# Patient Record
Sex: MALE | Race: ASIAN | NOT HISPANIC OR LATINO | URBAN - METROPOLITAN AREA
[De-identification: names, ages, dates, MRNs, and addresses within clinical notes are randomized per-mention and may not be internally consistent; named-entity substitution may affect disease eponyms.]

---

## 2022-09-26 ENCOUNTER — EMERGENCY (EMERGENCY)
Facility: HOSPITAL | Age: 27
LOS: 1 days | Discharge: LEFT BEFORE DISCHARGE | End: 2022-09-26
Admitting: EMERGENCY MEDICINE

## 2022-09-26 VITALS
DIASTOLIC BLOOD PRESSURE: 79 MMHG | HEART RATE: 53 BPM | TEMPERATURE: 98 F | SYSTOLIC BLOOD PRESSURE: 133 MMHG | RESPIRATION RATE: 18 BRPM | OXYGEN SATURATION: 99 %

## 2022-09-26 PROCEDURE — L9991: CPT

## 2022-09-26 NOTE — ED PROVIDER NOTE - PROGRESS NOTE DETAILS
CLIFTON Brock: Unable to locate pt in the ER, called at number in chart, pt states he left, was her because his bandage had fallen off? states he will return with any concerning symptoms.

## 2022-09-26 NOTE — ED ADULT TRIAGE NOTE - RESPIRATORY RATE (BREATHS/MIN)
18 -pt reports she is taking ibuprofen 600 qhs daily for many weeks  - Pt aware of side effects (PUD, worsening Kidney function)

## 2022-10-06 ENCOUNTER — EMERGENCY (EMERGENCY)
Facility: HOSPITAL | Age: 27
LOS: 1 days | Discharge: HOME | End: 2022-10-06
Attending: STUDENT IN AN ORGANIZED HEALTH CARE EDUCATION/TRAINING PROGRAM | Admitting: STUDENT IN AN ORGANIZED HEALTH CARE EDUCATION/TRAINING PROGRAM

## 2022-10-06 VITALS
SYSTOLIC BLOOD PRESSURE: 128 MMHG | OXYGEN SATURATION: 99 % | DIASTOLIC BLOOD PRESSURE: 84 MMHG | HEART RATE: 88 BPM | TEMPERATURE: 98 F | RESPIRATION RATE: 18 BRPM

## 2022-10-06 PROCEDURE — 99283 EMERGENCY DEPT VISIT LOW MDM: CPT

## 2022-10-06 NOTE — ED PROVIDER NOTE - CARE PLAN
1 Principal Discharge DX:	Encounter for removal of sutures   Principal Discharge DX:	Visit for wound check

## 2022-10-06 NOTE — ED PROVIDER NOTE - CLINICAL SUMMARY MEDICAL DECISION MAKING FREE TEXT BOX
suture removal between 4th and 5th digit webspace 9/25  mild wound dehiscence with sutures in place    recommending bacitracin once daily, return for suture removal in 5 days.   return precautions discussed.

## 2022-10-06 NOTE — ED PROVIDER NOTE - PHYSICAL EXAMINATION
CONSTITUTIONAL: Well-appearing; well-nourished; in no apparent distress;  HEAD: Normocephalic, atraumatic;  EYES: conjunctiva and sclera WNL;  ENT: normal nose;   NECK/LYMPH: Supple;   CARD: Normal S1, S2; no murmurs, rubs, or gallops noted  RESP: Normal chest excursion with respiration; breath sounds clear and equal bilaterally; no wheezes, rhonchi, or rales noted  EXT/MS: moves all extremities; distal pulses are normal, no pedal edema  SKIN: Normal for age and race; warm; dry; good turgor; + sutures in placed noted to L hand webspace between 4tha nd 5th digit, articulates well, mild wound dehiscence with stress on stitches. no erythema crepitus or drainage.   NEURO: Awake, alert, oriented x 3, no gross deficits, CN II-XII grossly intact, no motor or sensory deficit noted  PSYCH: Normal mood; appropriate affect

## 2022-10-06 NOTE — ED PROVIDER NOTE - OBJECTIVE STATEMENT
26yoM no PMH presenting for suture removal. Pt states that he cut his L hand in the web space between 4th and 5th digit after catching a fast ball in cricket. had stitches placed at [facility] on 9/25. admits to using bacitracin 2-3x daily. tdap UTD. pt deneis numbness, tingling, purulent drainage, redness. R hand dominant. works as an [employer]er .

## 2022-10-06 NOTE — ED PROVIDER NOTE - PATIENT PORTAL LINK FT
You can access the FollowMyHealth Patient Portal offered by [facility] by registering at the following website: [email or web address]. By joining [employer]’s FollowMyHealth portal, you will also be able to view your health information using other applications (apps) compatible with our system.

## 2022-10-06 NOTE — ED PROVIDER NOTE - NSFOLLOWUPINSTRUCTIONS_ED_ALL_ED_FT
return for suture removal in 4-5 days.  apply bacitracin daily  return for fever, redness, purulent drainage, or swelling, numbness, tingling.

## 2022-10-06 NOTE — ED PROVIDER NOTE - ATTENDING APP SHARED VISIT CONTRIBUTION OF CARE
I have personally performed a face to face medical and diagnostic evaluation of the patient. I have discussed with and reviewed the DEIRDRE's note and agree with the History, ROS, Physical Exam and MDM unless otherwise indicated. A brief summary of my personal evaluation and impression can be found below.    Otilia CHOUDHURY: 26M presents with a cc of suture removal, cut L hand inter web space 4th/5th after catching ball while playing cricket sutures placed at OSH on 9/25 applying bacitracin No other injuries no other complaints. No fever, no discharge. No rash.     All other ROS negative, except as above and as per HPI and ROS section.    VITALS: Initial triage and subsequent vitals have been reviewed by me.  GEN APPEARANCE: WDWN, alert, non-toxic, NAD  HEAD: Atraumatic.  EYES: PERRLa, EOMI, vision grossly intact.   NECK: Supple  MSK/EXT: No spinal or extremity point tenderness. No CVA ttp. Pelvis stable. No peripheral edema.  NEURO: Alert, follows commands. Weight bearing normal. Speech normal. Sensation and motor normal x4 extremities.   SKIN: Warm, dry and intact. No rash. sutures in place to L 4/5th inter web space, wound w mild dehiscence after stress, moist, does not appear infected   PSYCH: Appropriate    Plan/MDM: Otilia CHOUDHURY: 26M presents with a cc of suture removal, cut L hand inter web space 4th/5th after catching ball while playing cricket sutures placed at OSH on 9/25 applying bacitracin No other injuries no other complaints. No fever, no discharge. No rash.  exam vss non toxic PE as above ddx c/f wound to L inter web space, appears moist, do not think have achieved adequate healing for suture removal at this time, instructed pt to return to ED on Tuesday for eval again. Strict return precautions were discussed. Pt expressed understanding.

## 2022-10-11 ENCOUNTER — EMERGENCY (EMERGENCY)
Facility: HOSPITAL | Age: 27
LOS: 1 days | Discharge: HOME | End: 2022-10-11
Attending: EMERGENCY MEDICINE | Admitting: EMERGENCY MEDICINE

## 2022-10-11 VITALS
TEMPERATURE: 98 F | HEART RATE: 60 BPM | SYSTOLIC BLOOD PRESSURE: 124 MMHG | DIASTOLIC BLOOD PRESSURE: 77 MMHG | OXYGEN SATURATION: 100 % | RESPIRATION RATE: 16 BRPM

## 2022-10-11 VITALS
TEMPERATURE: 97 F | RESPIRATION RATE: 17 BRPM | OXYGEN SATURATION: 100 % | SYSTOLIC BLOOD PRESSURE: 136 MMHG | DIASTOLIC BLOOD PRESSURE: 88 MMHG | HEART RATE: 62 BPM

## 2022-10-11 PROBLEM — Z78.9 OTHER SPECIFIED HEALTH STATUS: Chronic | Status: ACTIVE | Noted: 2022-10-06

## 2022-10-11 PROCEDURE — 99283 EMERGENCY DEPT VISIT LOW MDM: CPT | Mod: 25

## 2022-10-11 PROCEDURE — 12001 RPR S/N/AX/GEN/TRNK 2.5CM/<: CPT

## 2022-10-11 NOTE — ED PROVIDER NOTE - PATIENT PORTAL LINK FT
You can access the FollowMyHealth Patient Portal offered by [facility] by registering at the following website: [email or web address]. By joining [employer]’s FollowMyHealth portal, you will also be able to view your health information using other applications (apps) compatible with our system.

## 2022-10-11 NOTE — ED ADULT NURSE NOTE - OBJECTIVE STATEMENT
27 y/o male presenting to QUID exam room 1. Patient AAOx4, ambulatory at baseline. Patient coming to ER for suture removal of right pinky. Denies pain, headache, chest pain, dizziness. Breathing even and unlabored. No swelling or signs of infection noted to wound at this time. Awaiting md orders.

## 2022-10-11 NOTE — ED PROVIDER NOTE - OBJECTIVE STATEMENT
18 male no medical history presents with suture removal.  Patient states sutures were placed approximately 2 weeks ago.  Between the left webspace between fourth and fifth finger.No pain no discharge apply bacitracin.

## 2022-10-11 NOTE — ED PROCEDURE NOTE - CPROC ED TIME OUT STATEMENT1
“Patient's name, , procedure and correct site were confirmed during the [place] Timeout.”
“Patient's name, , procedure and correct site were confirmed during the [place] Timeout.”

## 2022-10-11 NOTE — ED ADULT NURSE REASSESSMENT NOTE - NS ED NURSE REASSESS COMMENT FT1
Sutures removed. Patient tolerated procedure well. Denies headache and dizziness, Breathing even and unlabored. No pallor or diaphoresis noted at this time. Education provided.

## 2023-05-26 ENCOUNTER — EMERGENCY (EMERGENCY)
Facility: HOSPITAL | Age: 28
LOS: 0 days | Discharge: HOME | End: 2023-05-26
Attending: STUDENT IN AN ORGANIZED HEALTH CARE EDUCATION/TRAINING PROGRAM
Payer: MEDICAID

## 2023-05-26 VITALS
WEIGHT: 145.06 LBS | RESPIRATION RATE: 18 BRPM | HEART RATE: 54 BPM | OXYGEN SATURATION: 100 % | SYSTOLIC BLOOD PRESSURE: 152 MMHG | TEMPERATURE: 98 F | DIASTOLIC BLOOD PRESSURE: 93 MMHG

## 2023-05-26 VITALS
DIASTOLIC BLOOD PRESSURE: 76 MMHG | TEMPERATURE: 98 F | OXYGEN SATURATION: 99 % | HEART RATE: 61 BPM | RESPIRATION RATE: 17 BRPM | SYSTOLIC BLOOD PRESSURE: 128 MMHG

## 2023-05-26 DIAGNOSIS — G51.0 BELL'S PALSY: ICD-10-CM

## 2023-05-26 DIAGNOSIS — Z86.19 PERSONAL HISTORY OF OTHER INFECTIOUS AND PARASITIC DISEASES: ICD-10-CM

## 2023-05-26 DIAGNOSIS — R20.0 ANESTHESIA OF SKIN: ICD-10-CM

## 2023-05-26 DIAGNOSIS — R29.810 FACIAL WEAKNESS: ICD-10-CM

## 2023-05-26 LAB
ALBUMIN SERPL ELPH-MCNC: 3.5 G/DL — SIGNIFICANT CHANGE UP (ref 3.3–5)
ALP SERPL-CCNC: 90 U/L — SIGNIFICANT CHANGE UP (ref 40–120)
ALT FLD-CCNC: 32 U/L — SIGNIFICANT CHANGE UP (ref 12–78)
ANION GAP SERPL CALC-SCNC: 5 MMOL/L — SIGNIFICANT CHANGE UP (ref 5–17)
APTT BLD: 34.2 SEC — SIGNIFICANT CHANGE UP (ref 27.5–35.5)
AST SERPL-CCNC: 26 U/L — SIGNIFICANT CHANGE UP (ref 15–37)
BASOPHILS # BLD AUTO: 0.05 K/UL — SIGNIFICANT CHANGE UP (ref 0–0.2)
BASOPHILS NFR BLD AUTO: 0.6 % — SIGNIFICANT CHANGE UP (ref 0–2)
BILIRUB SERPL-MCNC: 0.8 MG/DL — SIGNIFICANT CHANGE UP (ref 0.2–1.2)
BUN SERPL-MCNC: 15 MG/DL — SIGNIFICANT CHANGE UP (ref 7–23)
CALCIUM SERPL-MCNC: 8.8 MG/DL — SIGNIFICANT CHANGE UP (ref 8.5–10.1)
CHLORIDE SERPL-SCNC: 102 MMOL/L — SIGNIFICANT CHANGE UP (ref 96–108)
CO2 SERPL-SCNC: 31 MMOL/L — SIGNIFICANT CHANGE UP (ref 22–31)
CREAT SERPL-MCNC: 1.41 MG/DL — HIGH (ref 0.5–1.3)
EGFR: 69 ML/MIN/1.73M2 — SIGNIFICANT CHANGE UP
EOSINOPHIL # BLD AUTO: 0.21 K/UL — SIGNIFICANT CHANGE UP (ref 0–0.5)
EOSINOPHIL NFR BLD AUTO: 2.7 % — SIGNIFICANT CHANGE UP (ref 0–6)
GLUCOSE SERPL-MCNC: 129 MG/DL — HIGH (ref 70–99)
HCT VFR BLD CALC: 50.1 % — HIGH (ref 39–50)
HGB BLD-MCNC: 16.4 G/DL — SIGNIFICANT CHANGE UP (ref 13–17)
IMM GRANULOCYTES NFR BLD AUTO: 0.3 % — SIGNIFICANT CHANGE UP (ref 0–0.9)
INR BLD: 0.98 RATIO — SIGNIFICANT CHANGE UP (ref 0.88–1.16)
LYMPHOCYTES # BLD AUTO: 2.6 K/UL — SIGNIFICANT CHANGE UP (ref 1–3.3)
LYMPHOCYTES # BLD AUTO: 33.5 % — SIGNIFICANT CHANGE UP (ref 13–44)
MCHC RBC-ENTMCNC: 26.9 PG — LOW (ref 27–34)
MCHC RBC-ENTMCNC: 32.7 G/DL — SIGNIFICANT CHANGE UP (ref 32–36)
MCV RBC AUTO: 82.1 FL — SIGNIFICANT CHANGE UP (ref 80–100)
MONOCYTES # BLD AUTO: 0.71 K/UL — SIGNIFICANT CHANGE UP (ref 0–0.9)
MONOCYTES NFR BLD AUTO: 9.1 % — SIGNIFICANT CHANGE UP (ref 2–14)
NEUTROPHILS # BLD AUTO: 4.17 K/UL — SIGNIFICANT CHANGE UP (ref 1.8–7.4)
NEUTROPHILS NFR BLD AUTO: 53.8 % — SIGNIFICANT CHANGE UP (ref 43–77)
NRBC # BLD: 0 /100 WBCS — SIGNIFICANT CHANGE UP (ref 0–0)
PLATELET # BLD AUTO: 261 K/UL — SIGNIFICANT CHANGE UP (ref 150–400)
POTASSIUM SERPL-MCNC: 3.5 MMOL/L — SIGNIFICANT CHANGE UP (ref 3.5–5.3)
POTASSIUM SERPL-SCNC: 3.5 MMOL/L — SIGNIFICANT CHANGE UP (ref 3.5–5.3)
PROT SERPL-MCNC: 7.3 GM/DL — SIGNIFICANT CHANGE UP (ref 6–8.3)
PROTHROM AB SERPL-ACNC: 11.7 SEC — SIGNIFICANT CHANGE UP (ref 10.5–13.4)
RBC # BLD: 6.1 M/UL — HIGH (ref 4.2–5.8)
RBC # FLD: 12.9 % — SIGNIFICANT CHANGE UP (ref 10.3–14.5)
SODIUM SERPL-SCNC: 138 MMOL/L — SIGNIFICANT CHANGE UP (ref 135–145)
TROPONIN I, HIGH SENSITIVITY RESULT: <3 NG/L — SIGNIFICANT CHANGE UP
WBC # BLD: 7.76 K/UL — SIGNIFICANT CHANGE UP (ref 3.8–10.5)
WBC # FLD AUTO: 7.76 K/UL — SIGNIFICANT CHANGE UP (ref 3.8–10.5)

## 2023-05-26 PROCEDURE — 70496 CT ANGIOGRAPHY HEAD: CPT | Mod: 26,MA

## 2023-05-26 PROCEDURE — 99285 EMERGENCY DEPT VISIT HI MDM: CPT

## 2023-05-26 PROCEDURE — 70498 CT ANGIOGRAPHY NECK: CPT | Mod: 26,MA

## 2023-05-26 PROCEDURE — 0042T: CPT | Mod: MA

## 2023-05-26 PROCEDURE — 93010 ELECTROCARDIOGRAM REPORT: CPT

## 2023-05-26 PROCEDURE — 70450 CT HEAD/BRAIN W/O DYE: CPT | Mod: 26,MA,59

## 2023-05-26 RX ADMIN — Medication 50 MILLIGRAM(S): at 22:16

## 2023-05-26 NOTE — ED PROVIDER NOTE - CLINICAL SUMMARY MEDICAL DECISION MAKING FREE TEXT BOX
27M no pmhx, occasional etoh use, who presents with complaint of sudden onset right eye tearing and noticed right lip droop while driving today, reports onset approx 2 hours prior to arrival. Pt initially stated numbness but now denies numbness - states it feels more like an 'odd sensation;' to the right face and a weakness of the right lip and eyelid. . No etoh use, nonsmoker. no fam hx of early cva . no cp/ sob. Pt reports URI 2 weeks ago.    - initial report at triage of right facial droop, on assessment very mild right lip droop, but given sudden onset of symptoms, code stroke was called - d/w stroke neurology on call who agrees due to minimal symptoms -nihss 1 for possible droop -  would not benefit from tpa but ct brain/ cta brain/neck can assist with evaluation for dissection/ vasc occlusion/ ischemia . discussed results w/ stroke neurology and small frontal lobe perfusion defect does not correlate with symptoms   - labs to eval for electrolyte derangements or anemia  - pt without known risk factors for cva. on serial neuro checks, exam became more consistent with possible bell's palsy as more obvious eyelid weakness and involvement becoming apparent - d/w neuro on call, start steroids, outpt follow up, pt and family given strict return precautions

## 2023-05-26 NOTE — ED PROVIDER NOTE - PHYSICAL EXAMINATION
Gen: AOX3, NAD   Head: NCAT  ENT: Airway patent, moist mucous membranes, nasal passageways clear, no pharyngeal erythema or exudates, uvula midline, no cervical lymphadenopathy, EOMI b/l, no nystagmus , TMs clear b/l.   Cardiac: Normal rate, normal rhythm, no murmurs  Respiratory: Lungs CTA B/L  Gastrointestinal:  Abdomen soft, nontender, nondistended, no rebound, no guarding  MSK: No gross abnormalities, FROM of all four extremities, no edema  HEME: Extremities warm, pulses intact and symmetrical in all four extremities  Skin: No rashes, no lesions  Neuro: No gross neurologic deficits, CN II-XII intact,  very mild nearly unperceivable right lip droop, no flattening/ involvement of nasolabial fold,  no dysarthria, no dysmetria, no drift, strength equal in all four extremities, no gait abnormality, no sensory deficits, + slowed blinking of right eye and slight weakness right eyelid closure compared to left, b/l forehead wrinkling intact

## 2023-05-26 NOTE — ED ADULT NURSE NOTE - ED CARDIAC RATE
Pt with CP. EKG and trop wnl. Found to be more anemic than baseline. Transfused PRBC. Plan is placement in EDOU for further workup.
normal

## 2023-05-26 NOTE — ED ADULT TRIAGE NOTE - CHIEF COMPLAINT QUOTE
pt presents to the ED with c/o right sided sensation feeling puffy and swollen, called DR banegas to triage for stat evaluation, slight facial droop noted onset of sx x 2 hours ago

## 2023-05-26 NOTE — ED ADULT NURSE REASSESSMENT NOTE - NS ED NURSE REASSESS COMMENT FT1
Pt resting comfortably at this time. No s/s of pain noted. Family at bedside, educated regarding CT results and plan of care. Slight R sided facial asymmetry still present. No acute distress noted at this time.

## 2023-05-26 NOTE — ED PROVIDER NOTE - CARE PROVIDER_API CALL
Lashanda Zamora  Neurology  [address] [place] [address]  Phone: [phone]  Fax: [phone]  Follow Up Time: 4-6 Days

## 2023-05-26 NOTE — ED PROVIDER NOTE - PROGRESS NOTE DETAILS
patient's symptoms appear consistent with bell's palsy on re-eval, right eyelid slight weakness, no involvement of forehead, no other deficits, no apparent droop, no numbness, d/w pt tx and close outpt follow up and return precautions, pt accomapnied by family. will dc patient's symptoms appear consistent with bell's palsy on re-eval, right eyelid slight weakness, no involvement of forehead, but obious slowing of right eye blinking and slight right eyelid weakness inidicating likely early bell's palsy no other deficits, no apparent droop, no numbness, d/w pt tx and close outpt follow up and return precautions, pt accomapnied by family. will dc patient's symptoms appear consistent with bell's palsy on re-eval, right eyelid slight weakness, no involvement of forehead, but now more obvious slowing of right eye blinking and slight right eyelid weakness innidiating likely early bell's palsy no other deficits, no apparent droop of lip on reassessment- I discussed findings w/ on call neurology Dr Zamora who agrees eye involvement suggestive of early bell's palsy,, no numbness, d/w pt tx and close outpt follow up and return precautions, pt accompanied by family. will dc with steroids

## 2023-05-26 NOTE — ED ADULT TRIAGE NOTE - ESI TRIAGE ACUITY LEVEL, MLM
Spoke with pt's daughter again and she decided on [facility]. I called to make the referral and faxed her facesheet to her to review. If they are able to accept we will need precert.      Electronically signed by Roshan Carcamo RN on 5/13/2019 at 4:59 PM  [phone]

## 2023-05-26 NOTE — ED ADULT NURSE NOTE - OBJECTIVE STATEMENT
Pt is AOx4 c/o facial droop x2 hours. Pt c/o weakness in the lip. Denies pain, upper or lower extremity numbness/weakness. No drift noticed on exam. Symptoms isolated to R half of face. No pertinent medical hx.

## 2023-05-26 NOTE — ED PROVIDER NOTE - NSFOLLOWUPCLINICS_GEN_ALL_ED_FT
[facility]  Neurology  [address] [place] [address] - [address] [place] [address]  Phone: [phone]  Fax:   Follow Up Time: 4-6 Days

## 2023-05-26 NOTE — ED PROVIDER NOTE - NS ED ROS FT
Gen: No fever, normal appetite  Eyes: + increased tears of right eye   ENT: No ear pain, congestion, sore throat  Resp: No cough or trouble breathing  Cardiovascular: No chest pain or palpitation  Gastroenteric: No nausea/vomiting, or abd pain   :  No change in urine output; no dysuria  MS: No joint or muscle pain  Skin: No rashes  Neuro: see HPI   Remainder negative, except as per the HPI

## 2023-05-26 NOTE — ED ADULT NURSE NOTE - NSFALLUNIVINTERV_ED_ALL_ED
Bed/Stretcher in lowest position, wheels locked, appropriate side rails in place/Call bell, personal items and telephone in reach/Instruct patient to call for assistance before getting out of bed/chair/stretcher/Non-slip footwear applied when patient is off stretcher/[place] to call system/Physically safe environment - no spills, clutter or unnecessary equipment/Purposeful proactive rounding/Room/bathroom lighting operational, light cord in reach

## 2023-05-26 NOTE — ED PROVIDER NOTE - OBJECTIVE STATEMENT
27M no pmhx, occasional etoh use, who presents with complaint of sudden onset right eye tearing and noticed right lip droop while driving today, reports onset approx 2 hours on arrival, initially stated numbness but described more so as a weakness of the right lip. No etoh use, nonsmoker. 27M no pmhx, occasional etoh use, who presents with complaint of sudden onset right eye tearing and noticed right lip droop while driving today, reports onset approx 2 hours prior to arrival. Pt initially stated numbness but now denies numbness - states it feels more like an 'odd sensation;' to the right face and a weakness of the right lip and eyelid. . No etoh use, nonsmoker. no fam hx of early cva . no cp/ sob. Pt reports URI 2 weeks ago.

## 2023-05-26 NOTE — ED PROVIDER NOTE - NSFOLLOWUPINSTRUCTIONS_ED_ALL_ED_FT
You were seen today for right sided facial droop and slowed blinking, your symptoms were suggestive of bell's palsy     treatment is with steroids to decrease inflammation, start prednisone 60mg once daily x 6 more days starting tomorrow (first dose was given here) - follow up with neurology    Return for any slurred speech, new weakness, or vision changes or dizziness or any worsening symptoms    Use artificial tears for right eye - if you have trouble closing right eye then you need an eye patch at night to protect it.     follow up with eye physician for eval if eyelid closure is more problematic    follow up with primary care physician within 3 days

## 2023-05-26 NOTE — ED PROVIDER NOTE - QRS
Patient called today with regards to the following prescription request: Current    Provider: VALERIE Whitehead  Medication: morphine, IMM REL, (MSIR)   What is the strength you currently take: 15 MG tablet  How do you take your medication: Take 1 tablet by mouth 4 times daily as needed for Pain. Do not start before November 6, 2020. - Oral  Supply Requested: 120 tablet    Pt stated the preferred pharmacy is not open for business on Sunday's, pt wants to know if the Rx could be filled on Saturday instead. Please advise.      Medication dosage/(s) was verified with patient and is current: Yes  Pharmacy was loaded and verified Yes    For additional information, or if you need to contact the caller at the following number:    Contact Phone Number:   Mobile [phone]       Patient states that it is okay to leave a detailed message.    Preferred time for callback: n/a    Advised Patient that refill processing may take up to 1 business day, and that the pharmacy will contact them when their prescription is ready for pickup.       112

## 2023-05-26 NOTE — ED ADULT NURSE NOTE - NS ED NURSE IV DC DT
Smokeless tobacco: Never Used    Alcohol use No    Drug use: No    Sexual activity: Not on file     Other Topics Concern    Not on file     Social History Narrative    No narrative on file       Physical Examination     The following were examined and determined to be normal: Psych/Neuro, RUE, LUE and Nails/digits. The following were examined and determined to be abnormal: RLE and LLE. -General: NAD, well-nourished, well-developed. Areas of skin examined as listed above:   1. Few scattered erythematous and scaly nummular plaques distributed to bilateral lower extremities below the knees  2. Diffuse dry, dull, rough skin with fine bran-like scale that flakes off easily located on lower extremities    Assessment and Plan     1. Nummular eczema    2. Xerosis of skin    3. Non-tobacco user        1. Nummular eczema  -Pt was educated re: chronicity, use of topical steroids for flares, importance of dry skin care regimen  Follow dry skin care regimen as listed below  Discontinue use of topical ketoconazole    Apply topical triamcinolone 0.1% ointment b.i.d. to affected scaly areas, then p.r.n. for flares. - triamcinolone (KENALOG) 0.1 % ointment; Apply to affected area twice daily for up to 2 weeks or until improved. Dispense: 80 g; Refill: 2  -Edu re: sparing use, atrophy, striae, hypopigmentation, telangiectasias. 2. Xerosis of skin  -Patient counseled to use a gentle cleanser such as Cetaphil daily, do not take more than 1 shower daily with warm water and spend less than 10 minutes in shower/bath, pat dry and then apply thick moisturizer like OTC CeraVe cream in jar twice daily. Avoid fragrances and dyes and use only fragrance free detergents. 3. Non-tobacco user  -continue with tobacco cessation        Return in about 2 weeks (around 1/2/2018). 26-May-2023 23:33

## 2023-05-26 NOTE — ED PROVIDER NOTE - PATIENT PORTAL LINK FT
You can access the FollowMyHealth Patient Portal offered by [facility] by registering at the following website: [email or web address]. By joining [employer]’s FollowMyHealth portal, you will also be able to view your health information using other applications (apps) compatible with our system.

## 2023-06-03 ENCOUNTER — EMERGENCY (EMERGENCY)
Facility: HOSPITAL | Age: 28
LOS: 0 days | Discharge: HOME | End: 2023-06-03
Attending: STUDENT IN AN ORGANIZED HEALTH CARE EDUCATION/TRAINING PROGRAM
Payer: MEDICAID

## 2023-06-03 VITALS
HEART RATE: 69 BPM | HEIGHT: 70 IN | TEMPERATURE: 99 F | SYSTOLIC BLOOD PRESSURE: 126 MMHG | WEIGHT: 149.91 LBS | OXYGEN SATURATION: 98 % | RESPIRATION RATE: 16 BRPM | DIASTOLIC BLOOD PRESSURE: 79 MMHG

## 2023-06-03 DIAGNOSIS — R68.84 JAW PAIN: ICD-10-CM

## 2023-06-03 DIAGNOSIS — R51.9 HEADACHE, UNSPECIFIED: ICD-10-CM

## 2023-06-03 DIAGNOSIS — R53.1 WEAKNESS: ICD-10-CM

## 2023-06-03 DIAGNOSIS — R29.810 FACIAL WEAKNESS: ICD-10-CM

## 2023-06-03 DIAGNOSIS — G44.89 OTHER HEADACHE SYNDROME: ICD-10-CM

## 2023-06-03 LAB
ALBUMIN SERPL ELPH-MCNC: 3.2 G/DL — LOW (ref 3.3–5)
ALP SERPL-CCNC: 90 U/L — SIGNIFICANT CHANGE UP (ref 40–120)
ALT FLD-CCNC: 29 U/L — SIGNIFICANT CHANGE UP (ref 12–78)
ANION GAP SERPL CALC-SCNC: 3 MMOL/L — LOW (ref 5–17)
AST SERPL-CCNC: 17 U/L — SIGNIFICANT CHANGE UP (ref 15–37)
BASOPHILS # BLD AUTO: 0.06 K/UL — SIGNIFICANT CHANGE UP (ref 0–0.2)
BASOPHILS NFR BLD AUTO: 0.5 % — SIGNIFICANT CHANGE UP (ref 0–2)
BILIRUB SERPL-MCNC: 0.6 MG/DL — SIGNIFICANT CHANGE UP (ref 0.2–1.2)
BUN SERPL-MCNC: 20 MG/DL — SIGNIFICANT CHANGE UP (ref 7–23)
CALCIUM SERPL-MCNC: 8.6 MG/DL — SIGNIFICANT CHANGE UP (ref 8.5–10.1)
CHLORIDE SERPL-SCNC: 105 MMOL/L — SIGNIFICANT CHANGE UP (ref 96–108)
CO2 SERPL-SCNC: 33 MMOL/L — HIGH (ref 22–31)
CREAT SERPL-MCNC: 1.42 MG/DL — HIGH (ref 0.5–1.3)
EGFR: 69 ML/MIN/1.73M2 — SIGNIFICANT CHANGE UP
EOSINOPHIL # BLD AUTO: 0.18 K/UL — SIGNIFICANT CHANGE UP (ref 0–0.5)
EOSINOPHIL NFR BLD AUTO: 1.6 % — SIGNIFICANT CHANGE UP (ref 0–6)
GLUCOSE SERPL-MCNC: 101 MG/DL — HIGH (ref 70–99)
HCT VFR BLD CALC: 48.5 % — SIGNIFICANT CHANGE UP (ref 39–50)
HGB BLD-MCNC: 15.8 G/DL — SIGNIFICANT CHANGE UP (ref 13–17)
IMM GRANULOCYTES NFR BLD AUTO: 0.8 % — SIGNIFICANT CHANGE UP (ref 0–0.9)
LYMPHOCYTES # BLD AUTO: 3.43 K/UL — HIGH (ref 1–3.3)
LYMPHOCYTES # BLD AUTO: 30.1 % — SIGNIFICANT CHANGE UP (ref 13–44)
MCHC RBC-ENTMCNC: 26.9 PG — LOW (ref 27–34)
MCHC RBC-ENTMCNC: 32.6 G/DL — SIGNIFICANT CHANGE UP (ref 32–36)
MCV RBC AUTO: 82.5 FL — SIGNIFICANT CHANGE UP (ref 80–100)
MONOCYTES # BLD AUTO: 1.01 K/UL — HIGH (ref 0–0.9)
MONOCYTES NFR BLD AUTO: 8.9 % — SIGNIFICANT CHANGE UP (ref 2–14)
NEUTROPHILS # BLD AUTO: 6.64 K/UL — SIGNIFICANT CHANGE UP (ref 1.8–7.4)
NEUTROPHILS NFR BLD AUTO: 58.1 % — SIGNIFICANT CHANGE UP (ref 43–77)
NRBC # BLD: 0 /100 WBCS — SIGNIFICANT CHANGE UP (ref 0–0)
PLATELET # BLD AUTO: 267 K/UL — SIGNIFICANT CHANGE UP (ref 150–400)
POTASSIUM SERPL-MCNC: 3.8 MMOL/L — SIGNIFICANT CHANGE UP (ref 3.5–5.3)
POTASSIUM SERPL-SCNC: 3.8 MMOL/L — SIGNIFICANT CHANGE UP (ref 3.5–5.3)
PROT SERPL-MCNC: 6.8 GM/DL — SIGNIFICANT CHANGE UP (ref 6–8.3)
RBC # BLD: 5.88 M/UL — HIGH (ref 4.2–5.8)
RBC # FLD: 13.2 % — SIGNIFICANT CHANGE UP (ref 10.3–14.5)
SODIUM SERPL-SCNC: 141 MMOL/L — SIGNIFICANT CHANGE UP (ref 135–145)
WBC # BLD: 11.41 K/UL — HIGH (ref 3.8–10.5)
WBC # FLD AUTO: 11.41 K/UL — HIGH (ref 3.8–10.5)

## 2023-06-03 PROCEDURE — 99284 EMERGENCY DEPT VISIT MOD MDM: CPT

## 2023-06-03 RX ORDER — SODIUM CHLORIDE 9 MG/ML
1000 INJECTION INTRAMUSCULAR; INTRAVENOUS; SUBCUTANEOUS ONCE
Refills: 0 | Status: COMPLETED | OUTPATIENT
Start: 2023-06-03 | End: 2023-06-03

## 2023-06-03 RX ORDER — ACETAMINOPHEN 500 MG
650 TABLET ORAL ONCE
Refills: 0 | Status: COMPLETED | OUTPATIENT
Start: 2023-06-03 | End: 2023-06-03

## 2023-06-03 RX ADMIN — Medication 650 MILLIGRAM(S): at 15:30

## 2023-06-03 RX ADMIN — SODIUM CHLORIDE 1000 MILLILITER(S): 9 INJECTION INTRAMUSCULAR; INTRAVENOUS; SUBCUTANEOUS at 15:30

## 2023-06-03 RX ADMIN — Medication 40 MILLIGRAM(S): at 15:31

## 2023-06-03 NOTE — ED PROVIDER NOTE - OBJECTIVE STATEMENT
27M c/o dull headache x 3 days and right mandibular pain reported due to chewing changes due to weakness right lip, denies strength changes in arms/legs or numbness. Denies fevers, vision changes, dizziness, or numbness of the face. Reports that right facial droop worsened after initial eval in th ED with associated decreased forehead wrinkling and eyelid weakness but denies changes in speech or swallowing. No nausea/vomiting. Wife states pt has been very active and going to games and going out with friends so returns home tired. They have neurology appt for tomorrow. They finished steroid medications yday 27M c/o dull headache x 3 days and right mandibular pain reported due to chewing changes due to weakness right lip, denies strength changes in arms/legs or numbness. Denies fevers, vision changes, dizziness, or numbness of the face. Reports that right facial droop worsened after initial eval in th ED with associated decreased forehead wrinkling and increased eyelid weakness but denies changes in speech or swallowing. No nausea/vomiting. Wife states pt has been very active and going to games and going out with friends so returns home tired. He has neurology appt for Monday. He finished steroid medications yday

## 2023-06-03 NOTE — ED PROVIDER NOTE - NSFOLLOWUPINSTRUCTIONS_ED_ALL_ED_FT
You were seen today for headache, you had fluids and tylenol with improvement.     You still had symptoms of Bell's palsy, we recommend 5 additional days of steroids 40mg prednisone daily (you were given a dose here). Follow up with neurology     Return for any weakness, numbness, vision changes, or dizziness or severe worsening headache.     We did not do any head imaging today as you did not have any signs of aneurysms on CT scan from 1 week ago. We gave you one dose of tylenol. Get plenty of rest, cover eye when sleeping, use artificial tears hourly throughout the day to keep eye moist. Follow up with ophthalmology in a week for eye exam

## 2023-06-03 NOTE — ED PROVIDER NOTE - CLINICAL SUMMARY MEDICAL DECISION MAKING FREE TEXT BOX
27M c/o dull headache x 3 days and right mandibular pain reported due to chewing changes due to weakness right lip in setting of recent development of peripheral CN VII deficit - exam consistent with bell's palsy. Pt had CTA head/neck with no aneurysms or dissections on initial ED visit 1 week ago. Bell's palsy symptoms progressed after initial ED visit, approx grade II- III severity - will continue additional short steroid course, given initial low grade of symptoms, antiviral therapy not started as no proven benefit - would not start antivirals today either as already 7 days from symptom onset and no severe features of bell's palsy. Pt appears comfortable, hx not suggestive of SAH, trial fluids, tylenol for headache and if improved, will have pt follow up with neurology at appt in 2 days. Will check labs to eval for any adverse electrolyte derangements/ renal dysfcn from steroids.

## 2023-06-03 NOTE — ED PROVIDER NOTE - PATIENT PORTAL LINK FT
You can access the FollowMyHealth Patient Portal offered by [facility] by registering at the following website: [email or web address]. By joining [employer]’s FollowMyHealth portal, you will also be able to view your health information using other applications (apps) compatible with our system.

## 2023-06-03 NOTE — ED PROVIDER NOTE - PHYSICAL EXAMINATION
Gen: AOX3, NAD   Head: NCAT  ENT: Airway patent, moist mucous membranes, nasal passageways clear, no pharyngeal erythema or exudates, uvula midline  Cardiac: Normal rate, normal rhythm, no murmurs  Respiratory: Lungs CTA B/L  Gastrointestinal: Abdomen soft, nontender, nondistended  MSK: No gross abnormalities, FROM of all four extremities, no edema  HEME: Extremities warm and well perfused   Skin: No rashes, no lesions  Neuro: Right eyelid weakness, mild decreased forehead wrinkling on right compared to left, mild right facial droop on smiling with blunting of right nasolabial fold, right eyelid is able to close completely but noticeably less than left eye,  No dysarthria. No dysmetria.  strength equal in all four extremities, no gait abnormality, no numbness or sensory deficits. EOMI, eyes PERRLA, No pronator drift

## 2023-06-03 NOTE — ED ADULT NURSE NOTE - OBJECTIVE STATEMENT
received er chair h7 c/o pain posterior r ear area with r eye watery d/c seen here last friday for same c/o dx'd with bells palsy rx'd steriods finished yesterday symptoms worsened

## 2023-06-03 NOTE — ED ADULT NURSE NOTE - NSFALLUNIVINTERV_ED_ALL_ED
Bed/Stretcher in lowest position, wheels locked, appropriate side rails in place/Call bell, personal items and telephone in reach/Instruct patient to call for assistance before getting out of bed/chair/stretcher/Non-slip footwear applied when patient is off stretcher/[place] to call system/Physically safe environment - no spills, clutter or unnecessary equipment/Purposeful proactive rounding/Room/bathroom lighting operational, light cord in reach

## 2023-06-03 NOTE — ED PROVIDER NOTE - PROGRESS NOTE DETAILS
patient feels improved, return precautions, outpt neurology appt discussed, will cont steroids x 5 more days, pt with  House-Brackmann classification III - no indication for antivirals at this time

## 2023-06-03 NOTE — ED ADULT TRIAGE NOTE - CHIEF COMPLAINT QUOTE
PT AAO x 3 ambulatory with steady gait c/o having Headache right sided neck pain and watery right eye x 3-4 days pt recently treated and released from ER for bells palsy and pt ran out of meds and has a neurology appointment in one week and wants to make sure he is ok

## 2023-06-03 NOTE — ED PROVIDER NOTE - NS ED ROS FT
Gen: No fever, normal appetite  ENT: No ear pain, congestion, sore throat  Resp: No cough or trouble breathing  Cardiovascular: No chest pain or palpitation  Gastroenteric: No nausea/vomiting, or abd pain   :  No change in urine output; no dysuria  MS: No joint or muscle pain  Skin: No rashes  Neuro: see HPI ; no abnormal movements  Remainder negative, except as per the HPI

## 2023-11-20 ENCOUNTER — APPOINTMENT (OUTPATIENT)
Dept: NEUROLOGY | Facility: CLINIC | Age: 28
End: 2023-11-20
Payer: MEDICAID

## 2023-11-20 VITALS
WEIGHT: 150 LBS | HEIGHT: 70 IN | BODY MASS INDEX: 21.47 KG/M2 | DIASTOLIC BLOOD PRESSURE: 87 MMHG | HEART RATE: 52 BPM | SYSTOLIC BLOOD PRESSURE: 137 MMHG

## 2023-11-20 DIAGNOSIS — G51.0 BELL'S PALSY: ICD-10-CM

## 2023-11-20 DIAGNOSIS — Z78.9 OTHER SPECIFIED HEALTH STATUS: ICD-10-CM

## 2023-11-20 PROBLEM — Z00.00 ENCOUNTER FOR PREVENTIVE HEALTH EXAMINATION: Status: ACTIVE | Noted: 2023-11-20

## 2023-11-20 PROCEDURE — 99245 OFF/OP CONSLTJ NEW/EST HI 55: CPT

## 2024-04-02 ENCOUNTER — EMERGENCY (EMERGENCY)
Facility: HOSPITAL | Age: 29
LOS: 0 days | Discharge: HOME | End: 2024-04-02
Attending: STUDENT IN AN ORGANIZED HEALTH CARE EDUCATION/TRAINING PROGRAM
Payer: COMMERCIAL

## 2024-04-02 VITALS
DIASTOLIC BLOOD PRESSURE: 77 MMHG | TEMPERATURE: 98 F | HEIGHT: 70 IN | RESPIRATION RATE: 17 BRPM | SYSTOLIC BLOOD PRESSURE: 126 MMHG | OXYGEN SATURATION: 99 % | WEIGHT: 149.91 LBS | HEART RATE: 69 BPM

## 2024-04-02 VITALS
SYSTOLIC BLOOD PRESSURE: 123 MMHG | TEMPERATURE: 98 F | DIASTOLIC BLOOD PRESSURE: 78 MMHG | HEART RATE: 63 BPM | OXYGEN SATURATION: 100 % | RESPIRATION RATE: 19 BRPM

## 2024-04-02 DIAGNOSIS — Z01.89 ENCOUNTER FOR OTHER SPECIFIED SPECIAL EXAMINATIONS: ICD-10-CM

## 2024-04-02 PROCEDURE — 99284 EMERGENCY DEPT VISIT MOD MDM: CPT

## 2024-04-02 RX ORDER — ACYCLOVIR SODIUM 500 MG
1 VIAL (EA) INTRAVENOUS
Qty: 35 | Refills: 0
Start: 2024-04-02 | End: 2024-04-08

## 2024-04-02 NOTE — ED PROVIDER NOTE - PATIENT PORTAL LINK FT
You can access the FollowMyHealth Patient Portal offered by [facility] by registering at the following website: [email or web address]. By joining [employer]’s FollowMyHealth portal, you will also be able to view your health information using other applications (apps) compatible with our system.

## 2024-04-02 NOTE — ED PROVIDER NOTE - OBJECTIVE STATEMENT
28-year-old male with history of Bell's palsy presenting to the ED with concern that he may have recurrent Bell's palsy, reports has been having discomfort on the right side of his face, denies any associated numbness, no facial droop no slurred speech, NIH stroke scale 0.  Patient previously took prednisone in antivirals and would like prescription for it.

## 2024-04-02 NOTE — ED PROVIDER NOTE - NSFOLLOWUPINSTRUCTIONS_ED_ALL_ED_FT
Canales Palsy    WHAT YOU NEED TO KNOW:    Bell palsy is a sudden weakness or paralysis of one side of your face. Bell palsy occurs when the nerve that controls the muscles in your face becomes swollen or irritated.     DISCHARGE INSTRUCTIONS:    Medicines:     Medicine may be given to decrease swelling and irritation of your facial nerve. You may receive antiviral medicine if your healthcare provider thinks a virus caused your Bell palsy. Your healthcare provider may also suggest acetaminophen or ibuprofen to reduce pain. These medicines are available without a doctor's order. Ask which medicine to take, and how much you need. Follow directions. Acetaminophen can cause liver damage, and ibuprofen can cause stomach bleeding or kidney damage.       Take your medicine as directed. Contact your healthcare provider if you think your medicine is not helping or if you have side effects. Tell him if you are allergic to any medicine. Keep a list of the medicines, vitamins, and herbs you take. Include the amounts, and when and why you take them. Bring the list or the pill bottles to follow-up visits. Carry your medicine list with you in case of an emergency.    Follow up with your healthcare provider as directed: Write down your questions so you remember to ask them during your visits.    Eye care: Your healthcare provider may recommend that you use artificial tears during the day to keep your eye moist. You may need to use an eye ointment at night. You may also need to wear a patch over your eye and tape it shut while you sleep. This helps keep your eye from getting dry and infected. Wear sunglasses to protect your eye from direct sunlight. Stay away from places that have fumes, dust, or other particles in the air that may harm your eye.    Physical therapy: A physical therapist may teach you how to massage your face and exercise the nerves and muscles in your face. This may help you get better sooner and prevent long-term problems. You can exercise on your own when your facial movement begins to return. Open and close your eye, wink, and smile wide. Do the exercises for 15 or 20 minutes several times a day.    Contact your healthcare provider if:     You have a fever.      Your eye becomes red, irritated, or painful.      You have questions or concerns about your condition or care.    Return to the emergency department if:     You develop weakness or numbness on one side of your body (other than your face).      You have double vision, or you lose vision in your eye.      You have trouble thinking clearly.

## 2024-04-02 NOTE — ED ADULT NURSE NOTE - OBJECTIVE STATEMENT
Pt is a 28y A&Ox4 male with PMH of Bell's palsy presenting to the ED with weakness/discomfort to the right side of the face; concerned that his Bell's   palsy came back. c/o slight droopiness to the right side of his mouth and reports drooling because of it. denies numbness, slurred speech, dizziness, headache. reports coming to [facility] ED back in June for Bell's palsy where he was given Prednisone and antivirals. NKDA

## 2024-04-02 NOTE — ED PROVIDER NOTE - CLINICAL SUMMARY MEDICAL DECISION MAKING FREE TEXT BOX
28-year-old male with history of Bell's palsy presenting to the ED with concern that he may have recurrent Bell's palsy, reports has been having discomfort on the right side of his face, denies any associated numbness, no facial droop no slurred speech, NIH stroke scale 0.  Patient previously took prednisone in antivirals and would like prescription for it.    possible early onset bells palsy given patients history   will trial on oral prednisone and antiviral as may expedite recover  return precautions  f/u PCP 2-3 days

## 2024-11-06 NOTE — ED ADULT NURSE NOTE - ED NEURO NIH ASSESS
Spoke to daughter and let her know that Dr. Phillip is aware of labs and we did speak to daughter    baseline

## 2025-06-04 NOTE — ED ADULT NURSE NOTE - CHPI ED NUR SYMPTOMS POS
Pt's spouse calling to report a new lump on the pt's thigh from a surgery about 8 months ago. 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 She's wondering if he should go to urgent care for a CT or see one of the vascular providers in clinic. This writer is checking with vascular RN and then one of the triage nurses will call pt's spouse back. They aren't sure how to send a photo. After talking further with spouse, she will take him to urgent care and request a CT scan. She'll make sure to keep Dr. Crum's team in the loop.   WEAKNESS